# Patient Record
Sex: FEMALE | URBAN - METROPOLITAN AREA
[De-identification: names, ages, dates, MRNs, and addresses within clinical notes are randomized per-mention and may not be internally consistent; named-entity substitution may affect disease eponyms.]

---

## 2019-06-05 ENCOUNTER — APPOINTMENT (RX ONLY)
Dept: URBAN - METROPOLITAN AREA CLINIC 79 | Facility: CLINIC | Age: 19
Setting detail: DERMATOLOGY
End: 2019-06-05

## 2019-06-05 DIAGNOSIS — L20.89 OTHER ATOPIC DERMATITIS: ICD-10-CM

## 2019-06-05 DIAGNOSIS — Z71.89 OTHER SPECIFIED COUNSELING: ICD-10-CM

## 2019-06-05 PROCEDURE — ? PRESCRIPTION

## 2019-06-05 PROCEDURE — ? PRESCRIPTION MEDICATION MANAGEMENT

## 2019-06-05 PROCEDURE — ? INVENTORY

## 2019-06-05 PROCEDURE — ? COUNSELING

## 2019-06-05 PROCEDURE — 99202 OFFICE O/P NEW SF 15 MIN: CPT

## 2019-06-05 PROCEDURE — ? SUNSCREEN RECOMMENDATIONS

## 2019-06-05 RX ORDER — HYDROCORTISONE 25 MG/G
CREAM TOPICAL
Qty: 1 | Refills: 2 | Status: ERX | COMMUNITY
Start: 2019-06-05

## 2019-06-05 RX ADMIN — HYDROCORTISONE: 25 CREAM TOPICAL at 00:00

## 2019-06-05 ASSESSMENT — LOCATION ZONE DERM: LOCATION ZONE: FACE

## 2019-06-05 ASSESSMENT — LOCATION SIMPLE DESCRIPTION DERM: LOCATION SIMPLE: LEFT CHEEK

## 2019-06-05 ASSESSMENT — LOCATION DETAILED DESCRIPTION DERM: LOCATION DETAILED: LEFT INFERIOR CENTRAL MALAR CHEEK

## 2020-08-13 ENCOUNTER — APPOINTMENT (RX ONLY)
Dept: URBAN - METROPOLITAN AREA CLINIC 77 | Facility: CLINIC | Age: 20
Setting detail: DERMATOLOGY
End: 2020-08-13

## 2020-08-13 DIAGNOSIS — L20.89 OTHER ATOPIC DERMATITIS: ICD-10-CM

## 2020-08-13 DIAGNOSIS — B00.0 ECZEMA HERPETICUM: ICD-10-CM

## 2020-08-13 PROCEDURE — ? PRESCRIPTION

## 2020-08-13 PROCEDURE — ? COUNSELING

## 2020-08-13 PROCEDURE — 99213 OFFICE O/P EST LOW 20 MIN: CPT

## 2020-08-13 PROCEDURE — ? TREATMENT REGIMEN

## 2020-08-13 PROCEDURE — ? ADDITIONAL NOTES

## 2020-08-13 RX ORDER — HYDROCORTISONE 25 MG/G
CREAM TOPICAL BID
Qty: 1 | Refills: 1 | Status: ERX

## 2020-08-13 RX ORDER — TRIAMCINOLONE ACETONIDE 1 MG/G
CREAM TOPICAL BID
Qty: 1 | Refills: 1 | Status: ERX | COMMUNITY
Start: 2020-08-13

## 2020-08-13 RX ORDER — VALACYCLOVIR HYDROCHLORIDE 1 G/1
TABLET, FILM COATED ORAL BID
Qty: 14 | Refills: 3 | Status: ERX | COMMUNITY
Start: 2020-08-13

## 2020-08-13 RX ORDER — MUPIROCIN 20 MG/G
OINTMENT TOPICAL BID
Qty: 1 | Refills: 3 | Status: ERX | COMMUNITY
Start: 2020-08-13

## 2020-08-13 RX ADMIN — VALACYCLOVIR HYDROCHLORIDE: 1 TABLET, FILM COATED ORAL at 00:00

## 2020-08-13 RX ADMIN — TRIAMCINOLONE ACETONIDE: 1 CREAM TOPICAL at 00:00

## 2020-08-13 RX ADMIN — MUPIROCIN: 20 OINTMENT TOPICAL at 00:00

## 2020-08-13 ASSESSMENT — LOCATION SIMPLE DESCRIPTION DERM
LOCATION SIMPLE: LEFT FOREARM
LOCATION SIMPLE: RIGHT FOREARM
LOCATION SIMPLE: LEFT FOREHEAD
LOCATION SIMPLE: UPPER LIP
LOCATION SIMPLE: LEFT HAND
LOCATION SIMPLE: RIGHT ANTERIOR NECK
LOCATION SIMPLE: RIGHT HAND

## 2020-08-13 ASSESSMENT — LOCATION ZONE DERM
LOCATION ZONE: LIP
LOCATION ZONE: ARM
LOCATION ZONE: FACE
LOCATION ZONE: HAND
LOCATION ZONE: NECK

## 2020-08-13 ASSESSMENT — LOCATION DETAILED DESCRIPTION DERM
LOCATION DETAILED: RIGHT DISTAL DORSAL FOREARM
LOCATION DETAILED: RIGHT INFERIOR ANTERIOR NECK
LOCATION DETAILED: LEFT RADIAL DORSAL HAND
LOCATION DETAILED: RIGHT RADIAL DORSAL HAND
LOCATION DETAILED: LEFT INFERIOR FOREHEAD
LOCATION DETAILED: PHILTRUM
LOCATION DETAILED: LEFT DISTAL DORSAL FOREARM

## 2020-08-13 NOTE — PROCEDURE: ADDITIONAL NOTES
Additional Notes: Patient consent was obtained to proceed with the visit and recommended plan of care after discussion of all risks and benefits, including the risks of COVID-19 exposure.
Detail Level: Simple
Detail Level: Zone
Additional Notes: Over the past few years pt reports a history of a blistering painful rash that occurs in her areas of eczema when she is stressed.\\n\\nEach time the rash occurs she goes to the ER. The ER told her they are unsure what it is, sent her to a Rheumatologist for a workup to rule out Lupus and give her IV and oral steroids.\\n\\nShe wanted to know if we knew what the rash was. She showed pictures on her phone today. 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 \\n\\nAccording to the photographs and the history, this is most consistent with Eczema Herpeticum. I have discussed this with the patient and have sent in rx for oral antiviral to help prevent future flares. Today she noticed a small crusted area on upper lip and feels tingling around her mouth. She is aware to begin the antiviral today and hopefully we can prevent a flare.\\n\\nPt is aware that if she is to get a severe outbreak she is to go to the ER as needed for IV antiviral or steroids if the oral valacyclovir does not control her rash. She is also aware that if she has a severe flare on her face or if it affects her eyes she is to go to the ER or the ophthalmologist asap. Contact precautions also discussed with patient.

## 2020-08-13 NOTE — HPI: RASH
What Type Of Note Output Would You Prefer (Optional)?: Standard Output
Is This A New Presentation, Or A Follow-Up?: Rash
Additional History: Patient gets a rash on her face at times that last about three weeks. ER has given her prednisone and cephalexin that clears the rash. The last time this rash occurred was the beginning of June. Patient has had it a total of three times in the past two years. Zero visible rash today but patient feels it may be starting again around her lip area. She saw a rheumatologist who did bloodwork to rule out lupus, which is still pending. Patient denies starting and new medication or personal care products before the rash starts. Does notice it flares when she has increased stress. Patient has photos today of rash

## 2020-12-31 NOTE — PROCEDURE: PRESCRIPTION MEDICATION MANAGEMENT
Detail Level: Zone
Samples Given: La roche Possay lipikar moisturizer for face daily
Otc Regimen: Aveeno, CeraVe, cetaphil cleansers
Render In Strict Bullet Format?: No
31-Dec-2020 14:30

## 2021-06-22 ENCOUNTER — APPOINTMENT (RX ONLY)
Dept: URBAN - METROPOLITAN AREA CLINIC 77 | Facility: CLINIC | Age: 21
Setting detail: DERMATOLOGY
End: 2021-06-22

## 2021-06-22 DIAGNOSIS — L20.89 OTHER ATOPIC DERMATITIS: ICD-10-CM

## 2021-06-22 DIAGNOSIS — H01.13 ECZEMATOUS DERMATITIS OF EYELID: ICD-10-CM

## 2021-06-22 PROBLEM — H01.134 ECZEMATOUS DERMATITIS OF LEFT UPPER EYELID: Status: ACTIVE | Noted: 2021-06-22

## 2021-06-22 PROBLEM — H01.131 ECZEMATOUS DERMATITIS OF RIGHT UPPER EYELID: Status: ACTIVE | Noted: 2021-06-22

## 2021-06-22 PROCEDURE — 99214 OFFICE O/P EST MOD 30 MIN: CPT

## 2021-06-22 PROCEDURE — ? ADDITIONAL NOTES

## 2021-06-22 PROCEDURE — ? COUNSELING

## 2021-06-22 PROCEDURE — ? PRESCRIPTION

## 2021-06-22 PROCEDURE — ? TREATMENT REGIMEN

## 2021-06-22 RX ORDER — CLOBETASOL PROPIONATE 0.5 MG/G
CREAM TOPICAL BID
Qty: 1 | Refills: 3 | Status: ERX | COMMUNITY
Start: 2021-06-22

## 2021-06-22 RX ORDER — CRISABOROLE 20 MG/G
OINTMENT TOPICAL BID
Qty: 1 | Refills: 3 | Status: ERX | COMMUNITY
Start: 2021-06-22

## 2021-06-22 RX ORDER — HYDROCORTISONE 25 MG/G
CREAM TOPICAL BID
Qty: 1 | Refills: 1 | Status: ERX

## 2021-06-22 RX ADMIN — CRISABOROLE: 20 OINTMENT TOPICAL at 00:00

## 2021-06-22 RX ADMIN — CLOBETASOL PROPIONATE: 0.5 CREAM TOPICAL at 00:00

## 2021-06-22 ASSESSMENT — LOCATION DETAILED DESCRIPTION DERM
LOCATION DETAILED: RIGHT DISTAL DORSAL FOREARM
LOCATION DETAILED: LEFT DISTAL DORSAL FOREARM
LOCATION DETAILED: LEFT RADIAL DORSAL HAND
LOCATION DETAILED: RIGHT MEDIAL SUPERIOR EYELID
LOCATION DETAILED: LEFT MEDIAL SUPERIOR EYELID
LOCATION DETAILED: RIGHT INFERIOR ANTERIOR NECK
LOCATION DETAILED: LEFT INFERIOR FOREHEAD
LOCATION DETAILED: RIGHT RADIAL DORSAL HAND

## 2021-06-22 ASSESSMENT — LOCATION SIMPLE DESCRIPTION DERM
LOCATION SIMPLE: LEFT HAND
LOCATION SIMPLE: LEFT FOREHEAD
LOCATION SIMPLE: RIGHT FOREARM
LOCATION SIMPLE: LEFT FOREARM
LOCATION SIMPLE: LEFT SUPERIOR EYELID
LOCATION SIMPLE: RIGHT HAND
LOCATION SIMPLE: RIGHT SUPERIOR EYELID
LOCATION SIMPLE: RIGHT ANTERIOR NECK

## 2021-06-22 ASSESSMENT — LOCATION ZONE DERM
LOCATION ZONE: EYELID
LOCATION ZONE: HAND
LOCATION ZONE: ARM
LOCATION ZONE: FACE
LOCATION ZONE: NECK

## 2023-07-07 ENCOUNTER — APPOINTMENT (RX ONLY)
Dept: URBAN - METROPOLITAN AREA CLINIC 81 | Facility: CLINIC | Age: 23
Setting detail: DERMATOLOGY
End: 2023-07-07

## 2023-07-07 DIAGNOSIS — L29.89 OTHER PRURITUS: ICD-10-CM

## 2023-07-07 DIAGNOSIS — L20.89 OTHER ATOPIC DERMATITIS: ICD-10-CM

## 2023-07-07 DIAGNOSIS — L29.8 OTHER PRURITUS: ICD-10-CM

## 2023-07-07 PROCEDURE — ? DUPIXENT INITIATION

## 2023-07-07 PROCEDURE — 99214 OFFICE O/P EST MOD 30 MIN: CPT | Mod: 25

## 2023-07-07 PROCEDURE — 96372 THER/PROPH/DIAG INJ SC/IM: CPT

## 2023-07-07 PROCEDURE — ? INTRAMUSCULAR KENALOG

## 2023-07-07 PROCEDURE — ? COUNSELING

## 2023-07-07 PROCEDURE — ? PRESCRIPTION

## 2023-07-07 RX ORDER — TRIAMCINOLONE ACETONIDE 1 MG/G
CREAM TOPICAL BID
Qty: 454 | Refills: 1 | Status: ERX | COMMUNITY
Start: 2023-07-07

## 2023-07-07 RX ORDER — HYDROXYZINE HYDROCHLORIDE 10 MG/1
TABLET, FILM COATED ORAL QHS
Qty: 60 | Refills: 1 | Status: ERX | COMMUNITY
Start: 2023-07-07

## 2023-07-07 RX ADMIN — TRIAMCINOLONE ACETONIDE: 1 CREAM TOPICAL at 00:00

## 2023-07-07 RX ADMIN — HYDROXYZINE HYDROCHLORIDE: 10 TABLET, FILM COATED ORAL at 00:00

## 2023-07-07 ASSESSMENT — LOCATION DETAILED DESCRIPTION DERM
LOCATION DETAILED: UPPER STERNUM
LOCATION DETAILED: RIGHT ANTECUBITAL SKIN
LOCATION DETAILED: RIGHT POPLITEAL SKIN
LOCATION DETAILED: RIGHT ELBOW
LOCATION DETAILED: LEFT KNEE
LOCATION DETAILED: LEFT PROXIMAL POSTERIOR UPPER ARM
LOCATION DETAILED: LEFT ELBOW
LOCATION DETAILED: RIGHT MEDIAL UPPER BACK
LOCATION DETAILED: LEFT ANTERIOR DISTAL UPPER ARM
LOCATION DETAILED: RIGHT KNEE
LOCATION DETAILED: PERIUMBILICAL SKIN
LOCATION DETAILED: INFERIOR THORACIC SPINE
LOCATION DETAILED: SUPERIOR THORACIC SPINE
LOCATION DETAILED: RIGHT BUTTOCK
LOCATION DETAILED: LEFT POPLITEAL SKIN
LOCATION DETAILED: RIGHT PROXIMAL POSTERIOR UPPER ARM

## 2023-07-07 ASSESSMENT — ITCH NUMERIC RATING SCALE: HOW SEVERE IS YOUR ITCHING?: 7

## 2023-07-07 ASSESSMENT — LOCATION SIMPLE DESCRIPTION DERM
LOCATION SIMPLE: RIGHT UPPER ARM
LOCATION SIMPLE: RIGHT POPLITEAL SKIN
LOCATION SIMPLE: LEFT KNEE
LOCATION SIMPLE: LEFT POSTERIOR UPPER ARM
LOCATION SIMPLE: RIGHT POSTERIOR UPPER ARM
LOCATION SIMPLE: LEFT POPLITEAL SKIN
LOCATION SIMPLE: RIGHT ELBOW
LOCATION SIMPLE: UPPER BACK
LOCATION SIMPLE: ABDOMEN
LOCATION SIMPLE: LEFT ELBOW
LOCATION SIMPLE: RIGHT KNEE
LOCATION SIMPLE: LEFT UPPER ARM
LOCATION SIMPLE: RIGHT UPPER BACK
LOCATION SIMPLE: RIGHT BUTTOCK
LOCATION SIMPLE: CHEST

## 2023-07-07 ASSESSMENT — LOCATION ZONE DERM
LOCATION ZONE: ARM
LOCATION ZONE: LEG
LOCATION ZONE: TRUNK

## 2023-07-07 ASSESSMENT — BSA ECZEMA: % BODY COVERED IN ECZEMA: 16

## 2023-07-07 NOTE — PROCEDURE: DUPIXENT INITIATION
Detail Level: Zone
Is Azathioprine Contraindicated?: No
Dupixent Monitoring Guidelines: There is no laboratory monitoring requirement with Dupixent.
Dupixent Dosing: 600 mg SC day 0 then 300 mg SC every other week
Diagnosis (Required): Atopic Dermatitis/Eczematous Dermatitis
Pregnancy And Lactation Warning Text: There have not been adverse fetal risks in women taking Dupixent while pregnant. It is unknown if this medication is excreted in breast milk.

## 2023-07-07 NOTE — PROCEDURE: INTRAMUSCULAR KENALOG
Detail Level: None
Consent: The risks of atrophy were reviewed with the patient.
Add Option For Additional Mediation: No
Treatment Number (Optional): 1
Administered By (Optional): Joe
Concentration (Mg/Ml): 40.0
Total Volume (Ccs): 2
Kenalog Preparation: kenalog
Concentration (Mg/Ml) Of Additional Medication: 2.5

## 2023-10-31 ENCOUNTER — APPOINTMENT (RX ONLY)
Dept: URBAN - METROPOLITAN AREA CLINIC 81 | Facility: CLINIC | Age: 23
Setting detail: DERMATOLOGY
End: 2023-10-31

## 2023-10-31 DIAGNOSIS — L20.89 OTHER ATOPIC DERMATITIS: ICD-10-CM

## 2023-10-31 PROCEDURE — ? DUPIXENT INJECTION

## 2023-10-31 PROCEDURE — ? COUNSELING

## 2023-10-31 PROCEDURE — 96372 THER/PROPH/DIAG INJ SC/IM: CPT

## 2023-10-31 ASSESSMENT — LOCATION SIMPLE DESCRIPTION DERM
LOCATION SIMPLE: LEFT POSTERIOR UPPER ARM
LOCATION SIMPLE: LEFT KNEE
LOCATION SIMPLE: LEFT ELBOW
LOCATION SIMPLE: RIGHT POSTERIOR UPPER ARM
LOCATION SIMPLE: CHEST
LOCATION SIMPLE: RIGHT UPPER ARM
LOCATION SIMPLE: RIGHT ELBOW
LOCATION SIMPLE: RIGHT POPLITEAL SKIN
LOCATION SIMPLE: LEFT UPPER ARM
LOCATION SIMPLE: ABDOMEN
LOCATION SIMPLE: LEFT POPLITEAL SKIN
LOCATION SIMPLE: UPPER BACK
LOCATION SIMPLE: RIGHT KNEE

## 2023-10-31 ASSESSMENT — LOCATION ZONE DERM
LOCATION ZONE: TRUNK
LOCATION ZONE: LEG
LOCATION ZONE: ARM

## 2023-10-31 ASSESSMENT — LOCATION DETAILED DESCRIPTION DERM
LOCATION DETAILED: LEFT KNEE
LOCATION DETAILED: LEFT ELBOW
LOCATION DETAILED: LEFT POPLITEAL SKIN
LOCATION DETAILED: UPPER STERNUM
LOCATION DETAILED: PERIUMBILICAL SKIN
LOCATION DETAILED: RIGHT LATERAL ABDOMEN
LOCATION DETAILED: RIGHT ANTECUBITAL SKIN
LOCATION DETAILED: RIGHT POPLITEAL SKIN
LOCATION DETAILED: SUPERIOR THORACIC SPINE
LOCATION DETAILED: INFERIOR THORACIC SPINE
LOCATION DETAILED: LEFT PROXIMAL POSTERIOR UPPER ARM
LOCATION DETAILED: LEFT LATERAL ABDOMEN
LOCATION DETAILED: RIGHT KNEE
LOCATION DETAILED: LEFT ANTERIOR DISTAL UPPER ARM
LOCATION DETAILED: RIGHT PROXIMAL POSTERIOR UPPER ARM
LOCATION DETAILED: RIGHT ELBOW

## 2023-10-31 NOTE — PROCEDURE: DUPIXENT INJECTION
Ndc (200 Mg Prefilled Syringe): [address]-57
Syringe Size Used (Required For Enhanced Ndc): 300 mg/2ml prefilled pen
Use Enhanced Ndc?: Yes
Expiration Date (Optional): 2025-08-31
Ndc (300 Mg Prefilled Pen): [address]-08
Treatment Number (Optional): 1
Dupixent Dosing: 600 mg
Lot # (Optional): [id number]
Was The Medication Purchased By The Clinic?: No
Consent: The risks of pain and injection site reactions were reviewed with the patient prior to the injection.
Date Of Next Injection: 2 Weeks
J-Code: 
Ndc (300 Mg Prefilled Syringe): [address]-00
Detail Level: None

## 2024-07-30 ENCOUNTER — APPOINTMENT (RX ONLY)
Dept: URBAN - METROPOLITAN AREA CLINIC 81 | Facility: CLINIC | Age: 24
Setting detail: DERMATOLOGY
End: 2024-07-30

## 2024-07-30 DIAGNOSIS — L20.89 OTHER ATOPIC DERMATITIS: ICD-10-CM

## 2024-07-30 DIAGNOSIS — D22 MELANOCYTIC NEVI: ICD-10-CM

## 2024-07-30 PROBLEM — D48.5 NEOPLASM OF UNCERTAIN BEHAVIOR OF SKIN: Status: ACTIVE | Noted: 2024-07-30

## 2024-07-30 PROCEDURE — 99214 OFFICE O/P EST MOD 30 MIN: CPT | Mod: 25

## 2024-07-30 PROCEDURE — ? COUNSELING

## 2024-07-30 PROCEDURE — 11103 TANGNTL BX SKIN EA SEP/ADDL: CPT

## 2024-07-30 PROCEDURE — ? BIOPSY BY SHAVE METHOD

## 2024-07-30 PROCEDURE — ? MDM - TREATMENT GOALS

## 2024-07-30 PROCEDURE — ? ADDITIONAL NOTES

## 2024-07-30 PROCEDURE — 11102 TANGNTL BX SKIN SINGLE LES: CPT

## 2024-07-30 PROCEDURE — ? PRESCRIPTION

## 2024-07-30 RX ORDER — TRIAMCINOLONE ACETONIDE 1 MG/G
CREAM TOPICAL
Qty: 453.6 | Refills: 1 | Status: ERX | COMMUNITY
Start: 2024-07-30

## 2024-07-30 RX ADMIN — TRIAMCINOLONE ACETONIDE: 1 CREAM TOPICAL at 00:00

## 2024-07-30 ASSESSMENT — LOCATION SIMPLE DESCRIPTION DERM
LOCATION SIMPLE: RIGHT POSTERIOR UPPER ARM
LOCATION SIMPLE: ABDOMEN
LOCATION SIMPLE: LEFT POSTERIOR UPPER ARM

## 2024-07-30 ASSESSMENT — LOCATION DETAILED DESCRIPTION DERM
LOCATION DETAILED: RIGHT DISTAL POSTERIOR UPPER ARM
LOCATION DETAILED: EPIGASTRIC SKIN
LOCATION DETAILED: LEFT DISTAL POSTERIOR UPPER ARM
LOCATION DETAILED: SUBXIPHOID

## 2024-07-30 ASSESSMENT — LOCATION ZONE DERM
LOCATION ZONE: TRUNK
LOCATION ZONE: ARM

## 2024-07-30 ASSESSMENT — ITCH NUMERIC RATING SCALE: HOW SEVERE IS YOUR ITCHING?: 4

## 2024-07-30 NOTE — PROCEDURE: ADDITIONAL NOTES
Render Risk Assessment In Note?: no
Detail Level: Zone
Additional Notes: Patient self discontinued Dupixent 1 month ago, advised this is a chronic condition and Atopic Dermatitis can reappear after discontinuation of medication.

## 2024-07-30 NOTE — PROCEDURE: BIOPSY BY SHAVE METHOD
Detail Level: Detailed
Depth Of Biopsy: dermis
Was A Bandage Applied: Yes
Size Of Lesion In Cm: 0.3
X Size Of Lesion In Cm: 0
Biopsy Type: H and E
Biopsy Method: Dermablade
Anesthesia Type: 1% lidocaine without epinephrine
Anesthesia Volume In Cc: 0.2
Hemostasis: Drysol
Wound Care: Petrolatum
Dressing: bandage
Destruction After The Procedure: No
Type Of Destruction Used: Curettage
Curettage Text: The wound bed was treated with curettage after the biopsy was performed.
Cryotherapy Text: The wound bed was treated with cryotherapy after the biopsy was performed.
Electrodesiccation Text: The wound bed was treated with electrodesiccation after the biopsy was performed.
Electrodesiccation And Curettage Text: The wound bed was treated with electrodesiccation and curettage after the biopsy was performed.
Silver Nitrate Text: The wound bed was treated with silver nitrate after the biopsy was performed.
Lab: [id number]
Lab Facility: [id number]
Consent: Written consent was obtained and risks were reviewed including but not limited to scarring, infection, bleeding, scabbing, incomplete removal, nerve damage and allergy to anesthesia.
Post-Care Instructions: I reviewed with the patient in detail post-care instructions. Patient is to keep the biopsy site dry overnight, and then apply bacitracin twice daily until healed. Patient may apply hydrogen peroxide soaks to remove any crusting.
Notification Instructions: Patient will be notified of biopsy results. However, patient instructed to call the office if not contacted within 2 weeks.
Billing Type: Third-Party Bill
Information: Selecting Yes will display possible errors in your note based on the variables you have selected. This validation is only offered as a suggestion for you. PLEASE NOTE THAT THE VALIDATION TEXT WILL BE REMOVED WHEN YOU FINALIZE YOUR NOTE. IF YOU WANT TO FAX A PRELIMINARY NOTE YOU WILL NEED TO TOGGLE THIS TO 'NO' IF YOU DO NOT WANT IT IN YOUR FAXED NOTE.
Size Of Lesion In Cm: 0.7

## 2025-07-24 ENCOUNTER — APPOINTMENT (OUTPATIENT)
Dept: URBAN - METROPOLITAN AREA CLINIC 81 | Facility: CLINIC | Age: 25
Setting detail: DERMATOLOGY
End: 2025-07-24

## 2025-07-24 DIAGNOSIS — L20.89 OTHER ATOPIC DERMATITIS: ICD-10-CM | Status: INADEQUATELY CONTROLLED

## 2025-07-24 PROCEDURE — ? DUPIXENT INITIATION

## 2025-07-24 PROCEDURE — ? MDM - TREATMENT GOALS

## 2025-07-24 PROCEDURE — ? COUNSELING

## 2025-07-24 ASSESSMENT — LOCATION SIMPLE DESCRIPTION DERM
LOCATION SIMPLE: RIGHT PRETIBIAL REGION
LOCATION SIMPLE: LEFT PRETIBIAL REGION
LOCATION SIMPLE: LEFT UPPER ARM
LOCATION SIMPLE: LEFT UPPER BACK
LOCATION SIMPLE: RIGHT ANTERIOR NECK
LOCATION SIMPLE: RIGHT FOREARM
LOCATION SIMPLE: LEFT CHEEK
LOCATION SIMPLE: RIGHT UPPER ARM
LOCATION SIMPLE: LEFT HAND
LOCATION SIMPLE: RIGHT CHEEK
LOCATION SIMPLE: RIGHT HAND
LOCATION SIMPLE: LEFT FOREARM

## 2025-07-24 ASSESSMENT — LOCATION ZONE DERM
LOCATION ZONE: NECK
LOCATION ZONE: ARM
LOCATION ZONE: LEG
LOCATION ZONE: HAND
LOCATION ZONE: TRUNK
LOCATION ZONE: FACE

## 2025-07-24 ASSESSMENT — LOCATION DETAILED DESCRIPTION DERM
LOCATION DETAILED: LEFT SUPERIOR UPPER BACK
LOCATION DETAILED: RIGHT ULNAR DORSAL HAND
LOCATION DETAILED: LEFT PROXIMAL DORSAL FOREARM
LOCATION DETAILED: LEFT ULNAR DORSAL HAND
LOCATION DETAILED: RIGHT PROXIMAL DORSAL FOREARM
LOCATION DETAILED: LEFT PROXIMAL PRETIBIAL REGION
LOCATION DETAILED: RIGHT INFERIOR CENTRAL MALAR CHEEK
LOCATION DETAILED: LEFT ANTECUBITAL SKIN
LOCATION DETAILED: RIGHT PROXIMAL PRETIBIAL REGION
LOCATION DETAILED: RIGHT ANTECUBITAL SKIN
LOCATION DETAILED: LEFT INFERIOR CENTRAL MALAR CHEEK
LOCATION DETAILED: RIGHT INFERIOR ANTERIOR NECK

## 2025-07-24 ASSESSMENT — ITCH NUMERIC RATING SCALE: HOW SEVERE IS YOUR ITCHING?: 8

## 2025-07-24 ASSESSMENT — BSA ECZEMA: % BODY COVERED IN ECZEMA: 50

## 2025-07-24 NOTE — PROCEDURE: DUPIXENT INITIATION
Is Phototherapy Contraindicated?: No
Pregnancy And Lactation Warning Text: There have not been adverse fetal risks in women taking Dupixent while pregnant. It is unknown if this medication is excreted in breast milk.
Dupixent Dosing: 600 mg SC day 0 then 300 mg SC every other week
Dupixent Monitoring Guidelines: There is no laboratory monitoring requirement with Dupixent.
Diagnosis (Required): Atopic Dermatitis/Eczematous Dermatitis
Detail Level: Zone